# Patient Record
Sex: MALE | Race: WHITE | Employment: FULL TIME | URBAN - NONMETROPOLITAN AREA
[De-identification: names, ages, dates, MRNs, and addresses within clinical notes are randomized per-mention and may not be internally consistent; named-entity substitution may affect disease eponyms.]

---

## 2019-06-05 ENCOUNTER — OFFICE VISIT (OUTPATIENT)
Dept: FAMILY MEDICINE CLINIC | Age: 44
End: 2019-06-05
Payer: COMMERCIAL

## 2019-06-05 VITALS
HEART RATE: 65 BPM | DIASTOLIC BLOOD PRESSURE: 68 MMHG | TEMPERATURE: 98.4 F | WEIGHT: 245 LBS | OXYGEN SATURATION: 96 % | HEIGHT: 74 IN | SYSTOLIC BLOOD PRESSURE: 128 MMHG | BODY MASS INDEX: 31.44 KG/M2

## 2019-06-05 DIAGNOSIS — M25.511 ACUTE PAIN OF RIGHT SHOULDER: Primary | ICD-10-CM

## 2019-06-05 PROCEDURE — 96372 THER/PROPH/DIAG INJ SC/IM: CPT | Performed by: FAMILY MEDICINE

## 2019-06-05 PROCEDURE — 99214 OFFICE O/P EST MOD 30 MIN: CPT | Performed by: FAMILY MEDICINE

## 2019-06-05 RX ORDER — OMEPRAZOLE 20 MG/1
TABLET, DELAYED RELEASE ORAL
COMMUNITY

## 2019-06-05 RX ORDER — METHYLPREDNISOLONE 4 MG/1
TABLET ORAL
Qty: 1 KIT | Refills: 0 | Status: SHIPPED
Start: 2019-06-05 | End: 2022-07-22 | Stop reason: ALTCHOICE

## 2019-06-05 RX ORDER — METHYLPREDNISOLONE ACETATE 40 MG/ML
40 INJECTION, SUSPENSION INTRA-ARTICULAR; INTRALESIONAL; INTRAMUSCULAR; SOFT TISSUE ONCE
Status: COMPLETED | OUTPATIENT
Start: 2019-06-05 | End: 2019-06-05

## 2019-06-05 RX ADMIN — METHYLPREDNISOLONE ACETATE 40 MG: 40 INJECTION, SUSPENSION INTRA-ARTICULAR; INTRALESIONAL; INTRAMUSCULAR; SOFT TISSUE at 14:01

## 2019-06-05 ASSESSMENT — ENCOUNTER SYMPTOMS
COUGH: 0
BACK PAIN: 0
PHOTOPHOBIA: 0
SORE THROAT: 0
SHORTNESS OF BREATH: 0

## 2019-06-05 NOTE — PROGRESS NOTES
2019     Jonnathan Pineda (:  1975) is a 40 y.o. male, here for evaluation of the following medical concerns:    Shoulder Injury    [place] The right shoulder is affected. The incident occurred 12 to 24 hours ago. The injury mechanism was a fall. The quality of the pain is described as aching, shooting and stabbing. The pain radiates to the right arm. The pain is at a severity of 5/10. The pain is moderate. Associated symptoms include muscle weakness. Pertinent negatives include no chest pain. The symptoms are aggravated by movement, overhead lifting and palpation. He has tried nothing for the symptoms. The treatment provided no relief. Review of Systems   Constitutional: Negative for chills and fever. HENT: Negative for congestion, hearing loss, nosebleeds and sore throat. Eyes: Negative for photophobia. Respiratory: Negative for cough and shortness of breath. Cardiovascular: Negative for chest pain, palpitations and leg swelling. Endocrine: Negative for polydipsia. Musculoskeletal: Positive for arthralgias, joint swelling and myalgias. Negative for back pain. Skin: Negative. Neurological: Negative for dizziness, tremors, weakness and headaches. Hematological: Does not bruise/bleed easily. Psychiatric/Behavioral: Negative for hallucinations and suicidal ideas. All other systems reviewed and are negative. Prior to Visit Medications    Medication Sig Taking? Authorizing Provider   methylPREDNISolone (MEDROL DOSEPACK) 4 MG tablet Take by mouth.  Yes Kuldeep Randall, DO   omeprazole (PRILOSEC OTC) 20 MG tablet Take by mouth  Historical Provider, MD        Social History     Tobacco Use    Smoking status: Not on file   Substance Use Topics    Alcohol use: Not on file        Vitals:    19 1325   BP: 128/68   Site: Left Upper Arm   Position: Sitting   Pulse: 65   Temp: 98.4 °F (36.9 °C)   SpO2: 96%   Weight: 245 lb (111.1 kg)   Height: 6' 2\" (1.88 m)     Estimated body mass index is 31.46 kg/m² as calculated from the following:    Height as of this encounter: 6' 2\" (1.88 m). Weight as of this encounter: 245 lb (111.1 kg). Physical Exam   Constitutional: He is oriented to person, place, and time. HENT:   Head: Normocephalic and atraumatic. Neck: Neck supple. No thyromegaly present. Cardiovascular: Normal rate, regular rhythm, normal heart sounds and intact distal pulses. No murmur heard. Pulmonary/Chest: Effort normal and breath sounds normal. He has no rales. Musculoskeletal: He exhibits tenderness. He exhibits no edema. Right shoulder: He exhibits decreased range of motion, tenderness, bony tenderness, swelling, pain and spasm. Lymphadenopathy:     He has no cervical adenopathy. Neurological: He is alert and oriented to person, place, and time. No cranial nerve deficit. Skin: Skin is warm and dry. No rash noted. No erythema. Psychiatric: Judgment normal.       ASSESSMENT/PLAN:  1. Acute pain of right shoulder  Review of x-ray today reveals no acute fracture dislocation of the affected region. Advise conservative treatment and shortness of steroids to help with inflammation. If no improvement in the next several days at our office for further evaluation and treatment. - XR SHOULDER RIGHT (MIN 2 VIEWS); Future  - methylPREDNISolone (MEDROL DOSEPACK) 4 MG tablet; Take by mouth. Dispense: 1 kit; Refill: 0  - methylPREDNISolone acetate (DEPO-MEDROL) injection 40 mg      No follow-ups on file. An electronic signature was used to authenticate this note.     --Alisa Randall DO on 6/5/2019 at 1:58 PM

## 2022-07-22 ENCOUNTER — OFFICE VISIT (OUTPATIENT)
Dept: FAMILY MEDICINE CLINIC | Age: 47
End: 2022-07-22
Payer: COMMERCIAL

## 2022-07-22 ENCOUNTER — TELEPHONE (OUTPATIENT)
Dept: FAMILY MEDICINE CLINIC | Age: 47
End: 2022-07-22

## 2022-07-22 VITALS — HEIGHT: 74 IN | WEIGHT: 245 LBS | OXYGEN SATURATION: 98 % | TEMPERATURE: 98.2 F | BODY MASS INDEX: 31.44 KG/M2

## 2022-07-22 DIAGNOSIS — M25.572 ACUTE LEFT ANKLE PAIN: Primary | ICD-10-CM

## 2022-07-22 DIAGNOSIS — S82.892A CLOSED FRACTURE OF LEFT ANKLE, INITIAL ENCOUNTER: ICD-10-CM

## 2022-07-22 PROCEDURE — 99213 OFFICE O/P EST LOW 20 MIN: CPT | Performed by: INTERNAL MEDICINE

## 2022-07-22 NOTE — TELEPHONE ENCOUNTER
Spoke with patient regarding the x-ray result.   I asked him to follow-up in Ortho walk-in next week

## 2022-07-23 NOTE — PROGRESS NOTES
[address] [place]     22  Richard Burn : 1975 Sex: male  Age: 52 y.o. Chief Complaint   Patient presents with    Foot Pain     Left ankle/foot pain; cant bear weight; slid on paper       HPI  Patient presents to express care this afternoon complaining of left foot and ankle pain after slipping and falling on some paper on the floor. Does not remember exactly how he came down on the foot and ankle. States it is hard to bear weight because of pain. This happened within the past hour. Review of Systems   Musculoskeletal:         See above   Skin: Negative. Neurological:  Negative for weakness and numbness. REST OF PERTINENT ROS GONE OVER AND WAS NEGATIVE. Current Outpatient Medications:     omeprazole (PRILOSEC OTC) 20 MG tablet, Take by mouth, Disp: , Rfl:   Allergies   Allergen Reactions    Penicillin G        No past medical history on file. No past surgical history on file. No family history on file.   Social History     Socioeconomic History    Marital status:      Spouse name: Not on file    Number of children: Not on file    Years of education: Not on file    Highest education level: Not on file   Occupational History    Not on file   Tobacco Use    Smoking status: Not on file    Smokeless tobacco: Not on file   Substance and Sexual Activity    Alcohol use: Not on file    Drug use: Not on file    Sexual activity: Not on file   Other Topics Concern    Not on file   Social History Narrative    Not on file     Social Determinants of Health     Financial Resource Strain: Not on file   Food Insecurity: Not on file   Transportation Needs: Not on file   Physical Activity: Not on file   Stress: Not on file   Social Connections: Not on file   Intimate Partner Violence: Not on file   Housing Stability: Not on file       Vitals:    22 1722   Temp: 98.2 °F (36.8 °C)   SpO2: 98%   Weight: 245 lb (111.1 kg)   Height: 6' 2\" (1.88 m)       Physical Exam  Vitals and nursing note reviewed. Constitutional:       General: He is not in acute distress. Musculoskeletal:         General: Tenderness present. No swelling or deformity. Comments: On examination patient had some point tenderness lateral ankle to palpation. No gross deformity noted no redness warmth or swelling. Neurovascularly intact. Most of his pain was to flexion and inversion of the foot. Skin:     General: Skin is warm and dry. Findings: No bruising or erythema. Neurological:      Mental Status: He is alert and oriented to person, place, and time. Sensory: No sensory deficit. Motor: No weakness. Psychiatric:         Mood and Affect: Mood normal.         Behavior: Behavior normal.         Thought Content: Thought content normal.         Judgment: Judgment normal.               Assessment and Plan:  Chery Ace was seen today for foot pain. Diagnoses and all orders for this visit:    Acute left ankle pain  -     XR FOOT LEFT (MIN 3 VIEWS); Future  -     XR ANKLE LEFT (MIN 3 VIEWS); Future    Closed fracture of left ankle, initial encounter    Plan: X-ray demonstrated suspected Avulsion/chip fracture at the ankle. Patient was placed in a boot. Await final reading. Return to orthopedic walk-in clinic Monday or Tuesday. Placed in boot. Ice. NSAIDs or Tylenol. Follow-up with PCP. Notify us of problems in the interim. No follow-ups on file. Seen By:  Jocy Shah MD      *Document was created using voice recognition software. Note was reviewed however may contain grammatical errors.

## 2022-07-24 ENCOUNTER — TELEPHONE (OUTPATIENT)
Dept: FAMILY MEDICINE CLINIC | Age: 47
End: 2022-07-24

## 2022-08-01 ENCOUNTER — OFFICE VISIT (OUTPATIENT)
Dept: ORTHOPEDIC SURGERY | Age: 47
End: 2022-08-01
Payer: COMMERCIAL

## 2022-08-01 VITALS — WEIGHT: 245 LBS | HEIGHT: 74 IN | BODY MASS INDEX: 31.44 KG/M2

## 2022-08-01 DIAGNOSIS — M79.672 LEFT FOOT PAIN: Primary | ICD-10-CM

## 2022-08-01 PROCEDURE — 99203 OFFICE O/P NEW LOW 30 MIN: CPT | Performed by: PHYSICIAN ASSISTANT

## 2022-08-01 NOTE — PROGRESS NOTES
[address] [facility],[address] In Care  New Patient Note      CHIEF COMPLAINT:   Chief Complaint   Patient presents with    Ankle Pain     Pt presents this PM with c/o L ankle pain. Was seen in urgent care on 7/22 and referred to brittany Mcdonald after slipping on paper on 7/22. X-rays were taken at urgent care. Pain is in lateral ankle, but pain is not constant. HISTORY OF PRESENT ILLNESS:                The patient is a 52 y.o. male who presents today for follow-up of left foot and ankle pain that he was seen for at [facility] on 7/22/2022. He states he slipped on a piece of paper at that time in inverted his ankle. He states he was put in a walking boot for possible navicular fracture and told to follow-up with our office. However, he admits that the pain decreased and he only wore the walking boot for a day and a half before he transitioned himself to a neoprene ankle sleeve. He states now, he has minimal pain that is intermittent when he \"turns his ankle the wrong way\". Otherwise, he is back to normal activities, he played basketball and attended [employer] without difficulties. Past Medical History:    No past medical history on file. Past Surgical History:    No past surgical history on file. Current Medications:   No current facility-administered medications for this visit. Allergies:  Penicillin g    Social History:   TOBACCO:   has no history on file for tobacco use. ETOH:   has no history on file for alcohol use. DRUGS:   has no history on file for drug use. OCCUPATION:  [employer]    Review of Systems   Constitutional: Negative for fever, chills, diaphoresis, appetite change and fatigue. HENT: Negative for dental issues, hearing loss and tinnitus. Negative for congestion, sinus pressure, sneezing, sore throat. Negative for headache. Eyes: Negative for visual disturbance, blurred and double vision.  Negative for pain, discharge, redness and itching  Respiratory: Negative for cough, shortness of breath and wheezing. Cardiovascular: Negative for chest pain, palpitations and leg swelling. No dyspnea on exertion   Gastrointestinal:   Negative for nausea, vomiting, abdominal pain, diarrhea, constipation  or black or bloody. Hematologic\Lymphatic:  negative for bleeding, petechiae,   Genitourinary: Negative for hematuria and difficulty urinating. Musculoskeletal: Negative for neck pain and stiffness. Negative for back pain, joint swelling and gait problem. +left ankle pain  Skin: Negative for pallor, rash and wound. Neurological: Negative for dizziness, tremors, seizures, weakness, light-headedness, no TIA or stroke symptoms. No numbness and headaches. Psychiatric/Behavioral: Negative. Physical Examination:   General appearance: alert, well appearing, and in no distress,  normal appearing weight  Mental status: alert, oriented to person, place, and time, normal mood, behavior, speech, dress, motor activity, and thought processes  Resp:   resp easy and unlabored, no audible wheezes note  Cardiac: distal pulses palpable, skin well perfused  Neurological: alert, oriented X3, normal speech, no focal findings or movement disorder noted, motor and sensory grossly normal bilaterally, normal muscle tone  HEENT: normochephalic atraumatic, external ears and eyes normal, sclera normal, neck supple  Extremities:   peripheral pulses normal, no edema, redness or tenderness in the calves   Skin: normal coloration, no rashes or open wounds, no suspicious skin lesions noted  Psych: Affect euthymic   Musculoskeletal:   On visual inspection there is no obvious deformity to left foot. There is no erythema, edema, ecchymosis or open wounds. There is no decreased sensation to light touch throughout the foot. Patient is grossly neurovascularly intact. Left Foot/Ankle: Patient is mildly tender to palpation at the anterior talofibular ligament, not tender to palpation elsewhere in the foot. Patient has full active range of motion of the left ankle in all 4 planes without difficulty. MMT 5/5 Dorsiflexion, 5/5 Plantarflexion, 5/5 Inversion, 5/5 Eversion. Strong dorsal pedis and posterior tibialis pulses noted. (-) Gray Figueroa, (-) Bert, (-) Heel bump    Ht 6' 2\" (1.88 m)   Wt 245 lb (111.1 kg)   BMI 31.46 kg/m²      XR: 3 view left foot x-rays were obtained in clinic today which show no evidence of fracture or dislocation. The imaging will be reviewed and interpreted by Radiologist.  The report was not complete at the time of this note. Please refer to Radiologist report for their interpretation. ASSESSMENT:   Diagnosis Orders   1. Left foot pain  XR FOOT LEFT (MIN 3 VIEWS)          PLAN:  Is a pleasant 68-year-old male who presents to the clinic today for follow-up of left ankle and foot pain that began after he slipped on a piece of paper. He was seen through [facility] on 7/22/2022 and had x-rays at that time which were noted to have a possible navicular fracture. He was placed in a boot and told to follow-up with our office. On exam today he has very minimal tenderness to palpation at the anterior talofibular ligament without tenderness to palpation anywhere else in the foot or ankle. Otherwise the remainder of his exam is unremarkable. I did repeat the left foot x-rays in the clinic today which show no evidence of fracture or dislocation. At this time, he is back to normal activities as well as high function activities such as playing basketball. I told patient he can continue activity as tolerated. I did provide him with a home exercise program for basic ankle range of motion and strengthening since he has had subsequent ankle injuries in the past.  Patient voiced understanding and agrees the treatment outlined for him in the office today. If he has additional questions or concerns he will call our office.   If his pain returns he should return to the clinic for further evaluation. Otherwise, I am more than happy to see him back on an as-needed basis. Electronically signed by Sofiya Vargas PA-C on 8/1/22 at 12:46 PM EDT    **This report was transcribed using voice recognition software. Every effort was made to ensure accuracy; however, inadvertent computerized transcription errors may be present. **

## 2022-09-16 ENCOUNTER — OFFICE VISIT (OUTPATIENT)
Dept: FAMILY MEDICINE CLINIC | Age: 47
End: 2022-09-16
Payer: COMMERCIAL

## 2022-09-16 VITALS
TEMPERATURE: 97.6 F | HEART RATE: 88 BPM | DIASTOLIC BLOOD PRESSURE: 110 MMHG | SYSTOLIC BLOOD PRESSURE: 136 MMHG | WEIGHT: 239 LBS | BODY MASS INDEX: 30.67 KG/M2 | OXYGEN SATURATION: 98 % | HEIGHT: 74 IN

## 2022-09-16 DIAGNOSIS — U07.1 COVID: ICD-10-CM

## 2022-09-16 DIAGNOSIS — J34.89 SINUS DRAINAGE: ICD-10-CM

## 2022-09-16 DIAGNOSIS — J02.9 SORE THROAT: ICD-10-CM

## 2022-09-16 DIAGNOSIS — R09.81 HEAD CONGESTION: ICD-10-CM

## 2022-09-16 LAB
Lab: ABNORMAL
PERFORMING INSTRUMENT: ABNORMAL
QC PASS/FAIL: ABNORMAL
S PYO AG THROAT QL: NORMAL
SARS-COV-2, POC: DETECTED

## 2022-09-16 PROCEDURE — 87880 STREP A ASSAY W/OPTIC: CPT | Performed by: INTERNAL MEDICINE

## 2022-09-16 PROCEDURE — 99213 OFFICE O/P EST LOW 20 MIN: CPT | Performed by: INTERNAL MEDICINE

## 2022-09-16 PROCEDURE — 87426 SARSCOV CORONAVIRUS AG IA: CPT | Performed by: INTERNAL MEDICINE

## 2022-09-16 RX ORDER — LIDOCAINE HYDROCHLORIDE 20 MG/ML
SOLUTION OROPHARYNGEAL
Qty: 300 ML | Refills: 0 | Status: SHIPPED | OUTPATIENT
Start: 2022-09-16

## 2022-09-16 RX ORDER — DOXYCYCLINE HYCLATE 100 MG
100 TABLET ORAL 2 TIMES DAILY
Qty: 20 TABLET | Refills: 0 | Status: SHIPPED
Start: 2022-09-16 | End: 2022-09-16 | Stop reason: CLARIF

## 2022-09-16 RX ORDER — PREDNISONE 10 MG/1
TABLET ORAL
Qty: 12 TABLET | Refills: 0 | Status: SHIPPED | OUTPATIENT
Start: 2022-09-16 | End: 2022-09-22

## 2022-09-17 ASSESSMENT — ENCOUNTER SYMPTOMS
NAUSEA: 0
ABDOMINAL PAIN: 0
WHEEZING: 0
VOMITING: 0
DIARRHEA: 0
SHORTNESS OF BREATH: 0
SINUS PRESSURE: 0
SINUS PAIN: 0
SORE THROAT: 1
COUGH: 1
CHEST TIGHTNESS: 0
EYES NEGATIVE: 1

## 2022-09-17 NOTE — PROGRESS NOTES
[address] [place]     22  Mague Skinner : 1975 Sex: male  Age: 52 y.o. Chief Complaint   Patient presents with    Cough     Onset Tuesday    Pharyngitis       HPI    Patient presents to express care today complaining of sore throat and cough that started 3 days ago. Also with sinus drainage and head congestion. Denies shortness of breath. Denies fever or chills. Denies loss of taste or smell. No myalgia or headache. No recent exposures that he is aware of. He is not vaccinated to COVID        Review of Systems   Constitutional:  Negative for chills and fever. HENT:  Positive for congestion, postnasal drip and sore throat. Negative for ear pain, sinus pressure and sinus pain. Eyes: Negative. Respiratory:  Positive for cough. Negative for chest tightness, shortness of breath and wheezing. Cardiovascular:  Negative for chest pain. Gastrointestinal:  Negative for abdominal pain, diarrhea, nausea and vomiting. Musculoskeletal:  Negative for myalgias. Neurological:  Negative for headaches. REST OF PERTINENT ROS GONE OVER AND WAS NEGATIVE. Current Outpatient Medications:     predniSONE (DELTASONE) 10 MG tablet, Take 3 tablets by mouth daily for 2 days, THEN 2 tablets daily for 2 days, THEN 1 tablet daily for 2 days. , Disp: 12 tablet, Rfl: 0    lidocaine viscous hcl (XYLOCAINE) 2 % SOLN solution, 15ml swish and spit q6h prn sore throat, Disp: 300 mL, Rfl: 0    omeprazole (PRILOSEC OTC) 20 MG tablet, Take by mouth, Disp: , Rfl:   Allergies   Allergen Reactions    Penicillin G        No past medical history on file. No past surgical history on file. No family history on file.   Social History     Socioeconomic History    Marital status:      Spouse name: Not on file    Number of children: Not on file    Years of education: Not on file    Highest education level: Not on file   Occupational History    Not on file   Tobacco Use    Smoking status: Never     Passive exposure: Never    Smokeless tobacco: Never   Substance and Sexual Activity    Alcohol use: Not on file    Drug use: Not on file    Sexual activity: Not on file   Other Topics Concern    Not on file   Social History Narrative    Not on file     Social Determinants of Health     Financial Resource Strain: Not on file   Food Insecurity: Not on file   Transportation Needs: Not on file   Physical Activity: Not on file   Stress: Not on file   Social Connections: Not on file   Intimate Partner Violence: Not on file   Housing Stability: Not on file       Vitals:    09/16/22 1205   BP: (!) 136/110   Pulse: 88   Temp: 97.6 °F (36.4 °C)   TempSrc: Temporal   SpO2: 98%   Weight: 239 lb (108.4 kg)   Height: 6' 2\" (1.88 m)       Physical Exam  Vitals and nursing note reviewed. Constitutional:       General: He is not in acute distress. Appearance: He is well-developed. HENT:      Head: Normocephalic and atraumatic. Right Ear: Tympanic membrane, ear canal and external ear normal.      Left Ear: Tympanic membrane, ear canal and external ear normal.      Nose: Nose normal.      Mouth/Throat:      Mouth: Mucous membranes are moist.      Pharynx: Oropharynx is clear. Posterior oropharyngeal erythema present. No oropharyngeal exudate. Comments: Clear mucus draining posterior pharynx  Cardiovascular:      Rate and Rhythm: Normal rate and regular rhythm. Heart sounds: Normal heart sounds. No murmur heard. Pulmonary:      Effort: Pulmonary effort is normal. No respiratory distress. Breath sounds: Normal breath sounds. No wheezing, rhonchi or rales. Musculoskeletal:      Cervical back: Normal range of motion and neck supple. No tenderness. Lymphadenopathy:      Cervical: No cervical adenopathy. Skin:     General: Skin is warm and dry. Neurological:      Mental Status: He is alert and oriented to person, place, and time.    Psychiatric:         Mood and Affect: Mood normal. Behavior: Behavior normal.         Thought Content: Thought content normal.         Judgment: Judgment normal.               Assessment and Plan:  Leigh Ann Clarke was seen today for cough and pharyngitis. Diagnoses and all orders for this visit:    COVID    Sore throat  -     POCT rapid strep A  -     POCT COVID-19, Antigen    Sinus drainage  -     POCT COVID-19, Antigen    Head congestion  -     POCT COVID-19, Antigen    Other orders  -     Discontinue: doxycycline hyclate (VIBRA-TABS) 100 MG tablet; Take 1 tablet by mouth 2 times daily for 10 days  -     predniSONE (DELTASONE) 10 MG tablet; Take 3 tablets by mouth daily for 2 days, THEN 2 tablets daily for 2 days, THEN 1 tablet daily for 2 days. -     lidocaine viscous hcl (XYLOCAINE) 2 % SOLN solution; 15ml swish and spit q6h prn sore throat  Plan: We did test him for COVID via rapid antigen was positive. Placed patient on prednisone taper dose as lidocaine swish and spit for his sore throat. Rapid strep was negative. Blood pressure was high today but I feel probably mostly related to his use of cough and cold medications. I asked him to stop those. Told him he can  an antihistamine. Push fluids. Vitamin C, vitamin D and zinc.  Follow-up with PCP. Quarantine instructions given. Notify us if not improving. Return for est.    Seen By:  Brittnee Resendez MD      *Document was created using voice recognition software. Note was reviewed however may contain grammatical errors.

## 2024-03-27 ENCOUNTER — HOSPITAL ENCOUNTER (EMERGENCY)
Age: 49
Discharge: HOME | End: 2024-03-27
Payer: COMMERCIAL

## 2024-03-27 ENCOUNTER — APPOINTMENT (OUTPATIENT)
Dept: CT IMAGING | Age: 49
End: 2024-03-27
Payer: COMMERCIAL

## 2024-03-27 VITALS
RESPIRATION RATE: 16 BRPM | OXYGEN SATURATION: 100 % | DIASTOLIC BLOOD PRESSURE: 96 MMHG | SYSTOLIC BLOOD PRESSURE: 155 MMHG | TEMPERATURE: 98.9 F | HEART RATE: 77 BPM

## 2024-03-27 DIAGNOSIS — N20.0 KIDNEY STONE: Primary | ICD-10-CM

## 2024-03-27 LAB
ANION GAP SERPL CALCULATED.3IONS-SCNC: 10 MMOL/L (ref 7–16)
BASOPHILS # BLD: 0.08 K/UL (ref 0–0.2)
BASOPHILS NFR BLD: 1 % (ref 0–2)
BILIRUB UR QL STRIP: NEGATIVE
BUN SERPL-MCNC: 15 MG/DL (ref 6–20)
CALCIUM SERPL-MCNC: 9.9 MG/DL (ref 8.6–10.2)
CHLORIDE SERPL-SCNC: 103 MMOL/L (ref 98–107)
CLARITY UR: CLEAR
CO2 SERPL-SCNC: 27 MMOL/L (ref 22–29)
COLOR UR: YELLOW
CREAT SERPL-MCNC: 1 MG/DL (ref 0.7–1.2)
EOSINOPHIL # BLD: 0 K/UL (ref 0.05–0.5)
EOSINOPHILS RELATIVE PERCENT: 0 % (ref 0–6)
ERYTHROCYTE [DISTWIDTH] IN BLOOD BY AUTOMATED COUNT: 13.1 % (ref 11.5–15)
GFR SERPL CREATININE-BSD FRML MDRD: 88 ML/MIN/1.73M2
GLUCOSE SERPL-MCNC: 120 MG/DL (ref 74–99)
GLUCOSE UR STRIP-MCNC: NEGATIVE MG/DL
HCT VFR BLD AUTO: 45.4 % (ref 37–54)
HGB BLD-MCNC: 14.5 G/DL (ref 12.5–16.5)
HGB UR QL STRIP.AUTO: ABNORMAL
KETONES UR STRIP-MCNC: ABNORMAL MG/DL
LEUKOCYTE ESTERASE UR QL STRIP: NEGATIVE
LYMPHOCYTES NFR BLD: 0.49 K/UL (ref 1.5–4)
LYMPHOCYTES RELATIVE PERCENT: 5 % (ref 20–42)
MCH RBC QN AUTO: 27.3 PG (ref 26–35)
MCHC RBC AUTO-ENTMCNC: 31.9 G/DL (ref 32–34.5)
MCV RBC AUTO: 85.3 FL (ref 80–99.9)
MONOCYTES NFR BLD: 0.41 K/UL (ref 0.1–0.95)
MONOCYTES NFR BLD: 4 % (ref 2–12)
NEUTROPHILS NFR BLD: 90 % (ref 43–80)
NEUTS SEG NFR BLD: 8.52 K/UL (ref 1.8–7.3)
NITRITE UR QL STRIP: NEGATIVE
PH UR STRIP: 6 [PH] (ref 5–9)
PLATELET # BLD AUTO: 255 K/UL (ref 130–450)
PMV BLD AUTO: 10.2 FL (ref 7–12)
POTASSIUM SERPL-SCNC: 4.1 MMOL/L (ref 3.5–5)
PROT UR STRIP-MCNC: NEGATIVE MG/DL
RBC # BLD AUTO: 5.32 M/UL (ref 3.8–5.8)
RBC # BLD: ABNORMAL 10*6/UL
RBC #/AREA URNS HPF: ABNORMAL /HPF
SODIUM SERPL-SCNC: 140 MMOL/L (ref 132–146)
SP GR UR STRIP: 1.02 (ref 1–1.03)
UROBILINOGEN UR STRIP-ACNC: 0.2 EU/DL (ref 0–1)
WBC #/AREA URNS HPF: ABNORMAL /HPF
WBC OTHER # BLD: 9.5 K/UL (ref 4.5–11.5)

## 2024-03-27 PROCEDURE — 74176 CT ABD & PELVIS W/O CONTRAST: CPT

## 2024-03-27 PROCEDURE — 85025 COMPLETE CBC W/AUTO DIFF WBC: CPT

## 2024-03-27 PROCEDURE — 96375 TX/PRO/DX INJ NEW DRUG ADDON: CPT

## 2024-03-27 PROCEDURE — 6360000002 HC RX W HCPCS: Performed by: PHYSICIAN ASSISTANT

## 2024-03-27 PROCEDURE — 81001 URINALYSIS AUTO W/SCOPE: CPT

## 2024-03-27 PROCEDURE — 99284 EMERGENCY DEPT VISIT MOD MDM: CPT

## 2024-03-27 PROCEDURE — 2580000003 HC RX 258: Performed by: PHYSICIAN ASSISTANT

## 2024-03-27 PROCEDURE — 96374 THER/PROPH/DIAG INJ IV PUSH: CPT

## 2024-03-27 PROCEDURE — 80048 BASIC METABOLIC PNL TOTAL CA: CPT

## 2024-03-27 RX ORDER — ONDANSETRON 2 MG/ML
4 INJECTION INTRAMUSCULAR; INTRAVENOUS ONCE
Status: COMPLETED | OUTPATIENT
Start: 2024-03-27 | End: 2024-03-27

## 2024-03-27 RX ORDER — 0.9 % SODIUM CHLORIDE 0.9 %
1000 INTRAVENOUS SOLUTION INTRAVENOUS ONCE
Status: COMPLETED | OUTPATIENT
Start: 2024-03-27 | End: 2024-03-27

## 2024-03-27 RX ORDER — TAMSULOSIN HYDROCHLORIDE 0.4 MG/1
0.4 CAPSULE ORAL DAILY
Qty: 5 CAPSULE | Refills: 0 | Status: SHIPPED | OUTPATIENT
Start: 2024-03-27 | End: 2024-04-01

## 2024-03-27 RX ORDER — KETOROLAC TROMETHAMINE 30 MG/ML
30 INJECTION, SOLUTION INTRAMUSCULAR; INTRAVENOUS ONCE
Status: COMPLETED | OUTPATIENT
Start: 2024-03-27 | End: 2024-03-27

## 2024-03-27 RX ORDER — ONDANSETRON 4 MG/1
4 TABLET, ORALLY DISINTEGRATING ORAL 3 TIMES DAILY PRN
Qty: 21 TABLET | Refills: 0 | Status: SHIPPED | OUTPATIENT
Start: 2024-03-27

## 2024-03-27 RX ORDER — KETOROLAC TROMETHAMINE 10 MG/1
10 TABLET, FILM COATED ORAL EVERY 6 HOURS PRN
Qty: 20 TABLET | Refills: 0 | Status: SHIPPED | OUTPATIENT
Start: 2024-03-27

## 2024-03-27 RX ORDER — HYDROCODONE BITARTRATE AND ACETAMINOPHEN 5; 325 MG/1; MG/1
1 TABLET ORAL EVERY 6 HOURS PRN
Qty: 10 TABLET | Refills: 0 | Status: SHIPPED | OUTPATIENT
Start: 2024-03-27 | End: 2024-03-30

## 2024-03-27 RX ADMIN — KETOROLAC TROMETHAMINE 30 MG: 30 INJECTION, SOLUTION INTRAMUSCULAR at 22:27

## 2024-03-27 RX ADMIN — SODIUM CHLORIDE 1000 ML: 9 INJECTION, SOLUTION INTRAVENOUS at 22:27

## 2024-03-27 RX ADMIN — ONDANSETRON 4 MG: 2 INJECTION INTRAMUSCULAR; INTRAVENOUS at 22:26

## 2024-03-27 ASSESSMENT — PAIN SCALES - GENERAL
PAINLEVEL_OUTOF10: 5
PAINLEVEL_OUTOF10: 6

## 2024-03-27 ASSESSMENT — PAIN DESCRIPTION - ORIENTATION: ORIENTATION: RIGHT;LOWER

## 2024-03-27 ASSESSMENT — PAIN DESCRIPTION - LOCATION: LOCATION: ABDOMEN

## 2024-03-27 ASSESSMENT — LIFESTYLE VARIABLES
HOW OFTEN DO YOU HAVE A DRINK CONTAINING ALCOHOL: NEVER
HOW MANY STANDARD DRINKS CONTAINING ALCOHOL DO YOU HAVE ON A TYPICAL DAY: PATIENT DOES NOT DRINK

## 2024-03-27 ASSESSMENT — PAIN - FUNCTIONAL ASSESSMENT: PAIN_FUNCTIONAL_ASSESSMENT: 0-10

## 2024-03-28 NOTE — ED NOTES
The following labs were labeled with appropriate pt sticker and tubed to lab:     [] Blue     [] Lavender   [] on ice  [] Green/yellow  [] Green/black [] on ice  [] Grey  [] on ice  [] Yellow  [] Red  [] Pink  [] Type/ Screen  [] ABG  [] VBG    [] COVID-19 swab    [] Rapid  [] PCR  [] Flu swab  [] Peds Viral Panel     [] Urine Sample  [] Fecal Sample  [] Pelvic Cultures  [] Blood Cultures  [] X 2  [] STREP Cultures  [] Wound Cultures

## 2024-03-28 NOTE — ED PROVIDER NOTES
Independent LINDA Visit.      [facility]  Department of Emergency Medicine   ED  Encounter Note  Admit Date/RoomTime: 3/27/2024 10:04 PM  ED Room: 37/    NAME: Jorge Luis Marte  : 1975  MRN: [id number]     Chief Complaint:  Abdominal Pain (RLQ pain, onset 1400 today. Patient still has appendix ), Nausea, and Emesis    History of Present Illness        Jorge Luis Marte is a 49 y.o. old male who presents to the emergency department by private vehicle, for sudden onset colicky, sharp pain in the RLQwithout radiation which began several minute(s) prior to arrival.  There has been similar episodes earlier in the day which resolved after a few minutes.   Since onset the symptoms have been persistent.  The pain is associated with nausea and vomiting.  The pain is aggravated by movement and relieved by nothing, pt did  not try any otc medications. There has been NO back pain, chest pain, shortness of breath, fever, chills, diarrhea, constipation, dysuria, or hematuria.     ROS   Pertinent positives and negatives are stated within HPI, all other systems reviewed and are negative.    Past Medical History:  has no past medical history on file.    Surgical History:  has no past surgical history on file.    Social History:  reports that he has never smoked. He has never been exposed to tobacco smoke. He has never used smokeless tobacco.    Family History: family history is not on file.     Allergies: Penicillin g    Physical Exam   Oxygen Saturation Interpretation: Normal.        ED Triage Vitals   BP Temp Temp Source Pulse Respirations SpO2 Height Weight   24 2328 24 -- --   (!) 164/95 98.8 °F (37.1 °C) Oral 71 16 100 %         Physical Exam  General Appearance/Constitutional:  Alert, development consistent with age.  HEENT:  NC/NT. PERRLA.  Airway patent.  Neck:  Supple.  No lymphadenopathy.  Respiratory: Lungs Clear to auscultation and 
Deferred at this time

## 2024-03-28 NOTE — DISCHARGE INSTR - COC
Colostomy/Ileostomy/Ileal Conduit: {YES / NO:20463}       Date of Last BM: ***  No intake or output data in the 24 hours ending 24 2335  No intake/output data recorded.    Safety Concerns:     { TALIA Safety Concerns:492140290}    Impairments/Disabilities:      { TALIA Impairments/Disabilities:966877336}    Nutrition Therapy:  Current Nutrition Therapy:   { TALIA Diet List:971015816}    Routes of Feeding: {[facility] D[facility] Other Feedings:671634050}  Liquids: {Slp liquid thickness:83693}  Daily Fluid Restriction: {[facility] D[facility] Yes amt example:787961556}  Last Modified Barium Swallow with Video (Video Swallowing Test): {Done Not Done Date:500807972}    Treatments at the Time of Hospital Discharge:   Respiratory Treatments: ***  Oxygen Therapy:  {Therapy; copd oxygen:75338}  Ventilator:    { CC Vent List:656439386}    Rehab Therapies: {THERAPEUTIC INTERVENTION:3202116130}  Weight Bearing Status/Restrictions: {[facility] Weight Bearin}  Other Medical Equipment (for information only, NOT a DME order):  {EQUIPMENT:363576479}  Other Treatments: ***    Patient's personal belongings (please select all that are sent with patient):  {[facility] DME Belongings:605956507}    RN SIGNATURE:  {Esignature:259051685}    CASE MANAGEMENT/SOCIAL WORK SECTION    Inpatient Status Date: ***    Readmission Risk Assessment Score:  Readmission Risk              Risk of Unplanned Readmission:  0           Discharging to Facility/ Agency   Name:   Address:  Phone:  Fax:    Dialysis Facility (if applicable)   Name:  Address:  Dialysis Schedule:  Phone:  Fax:    / signature: {Esignature:[id number]}    PHYSICIAN SECTION    Prognosis: {Prognosis:9320171549}    Condition at Discharge: { Patient Condition:424819442}    Rehab Potential (if transferring to Rehab): {Prognosis:6713239625}    Recommended Labs or Other Treatments After Discharge: ***    Physician Certification: I certify the above information and transfer of Jorge Luis

## 2024-10-21 ENCOUNTER — OFFICE VISIT (OUTPATIENT)
Dept: FAMILY MEDICINE CLINIC | Age: 49
End: 2024-10-21
Payer: COMMERCIAL

## 2024-10-21 VITALS
HEART RATE: 63 BPM | SYSTOLIC BLOOD PRESSURE: 136 MMHG | DIASTOLIC BLOOD PRESSURE: 84 MMHG | BODY MASS INDEX: 31.07 KG/M2 | TEMPERATURE: 97.3 F | RESPIRATION RATE: 15 BRPM | OXYGEN SATURATION: 98 % | WEIGHT: 242 LBS

## 2024-10-21 DIAGNOSIS — B02.9 HERPES ZOSTER WITHOUT COMPLICATION: Primary | ICD-10-CM

## 2024-10-21 PROCEDURE — 99214 OFFICE O/P EST MOD 30 MIN: CPT | Performed by: FAMILY MEDICINE

## 2024-10-21 RX ORDER — VALACYCLOVIR HYDROCHLORIDE 1 G/1
1000 TABLET, FILM COATED ORAL 3 TIMES DAILY
Qty: 21 TABLET | Refills: 0 | Status: SHIPPED | OUTPATIENT
Start: 2024-10-21 | End: 2024-10-28

## 2024-10-21 NOTE — PROGRESS NOTES
[facility]    Jorge Luis Marte presents to the office today for   Chief Complaint   Patient presents with    Rash     Left sided facial rash  Started on top of his head  Tingling pain  No below eyebrow  Vision ok    Review of Systems     /84   Pulse 63   Temp 97.3 °F (36.3 °C) (Temporal)   Resp 15   Wt 109.8 kg (242 lb)   SpO2 98%   BMI 31.07 kg/m²   Physical Exam  Constitutional:       Appearance: Normal appearance.   HENT:      Head: Normocephalic and atraumatic.        Comments: shingles  Eyes:      Extraocular Movements: Extraocular movements intact.      Conjunctiva/sclera: Conjunctivae normal.   Cardiovascular:      Rate and Rhythm: Normal rate.   Pulmonary:      Effort: Pulmonary effort is normal.   Skin:     General: Skin is warm.   Neurological:      Mental Status: He is alert and oriented to person, place, and time.   Psychiatric:         Mood and Affect: Mood normal.         Behavior: Behavior normal.            Current Outpatient Medications:     valACYclovir (VALTREX) 1 g tablet, Take 1 tablet by mouth 3 times daily for 7 days, Disp: 21 tablet, Rfl: 0    tamsulosin (FLOMAX) 0.4 MG capsule, Take 1 capsule by mouth daily for 5 days, Disp: 5 capsule, Rfl: 0    ketorolac (TORADOL) 10 MG tablet, Take 1 tablet by mouth every 6 hours as needed for Pain, Disp: 20 tablet, Rfl: 0    ondansetron (ZOFRAN-ODT) 4 MG disintegrating tablet, Take 1 tablet by mouth 3 times daily as needed for Nausea or Vomiting, Disp: 21 tablet, Rfl: 0    omeprazole (PRILOSEC OTC) 20 MG tablet, Take by mouth, Disp: , Rfl:      No past medical history on file.    Jorge Luis was seen today for rash.    Diagnoses and all orders for this visit:    Herpes zoster without complication  -     valACYclovir (VALTREX) 1 g tablet; Take 1 tablet by mouth 3 times daily for 7 days  -     [facility]YES[facility] - Ulices Gutierrez MD, Ophthalmology, [place]       No sign of ophthalmic involvement yet  Urgent referral for tomorrow  Start Valtrex  Avoid infant